# Patient Record
Sex: MALE | Race: WHITE | URBAN - METROPOLITAN AREA
[De-identification: names, ages, dates, MRNs, and addresses within clinical notes are randomized per-mention and may not be internally consistent; named-entity substitution may affect disease eponyms.]

---

## 2018-07-30 ENCOUNTER — OFFICE VISIT (OUTPATIENT)
Dept: URBAN - METROPOLITAN AREA CLINIC 193 | Facility: CLINIC | Age: 78
End: 2018-07-30
Payer: COMMERCIAL

## 2018-07-30 DIAGNOSIS — H18.51 ENDOTHELIAL CORNEAL DYSTROPHY: Primary | ICD-10-CM

## 2018-07-30 DIAGNOSIS — H52.223 REGULAR ASTIGMATISM, BILATERAL: ICD-10-CM

## 2018-07-30 PROCEDURE — 92014 COMPRE OPH EXAM EST PT 1/>: CPT | Performed by: OPTOMETRIST

## 2018-07-30 ASSESSMENT — VISUAL ACUITY
OS: 20/30
OD: 20/40+

## 2018-07-30 ASSESSMENT — INTRAOCULAR PRESSURE
OS: 20
OD: 19

## 2018-07-30 NOTE — IMPRESSION/PLAN
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 Fuch's OU. s/p DSAEK OU. Stable vision OU. Plan: Discussed condition. Call if worsens. Use ATs PRN.

## 2018-07-30 NOTE — IMPRESSION/PLAN
Impression: Regular astigmatism, bilateral: H52.223. Plan: A glasses prescription has been discussed and generated. Patient to call with any concerns.

## 2020-06-26 ENCOUNTER — OFFICE VISIT (OUTPATIENT)
Dept: URBAN - METROPOLITAN AREA CLINIC 71 | Facility: CLINIC | Age: 80
End: 2020-06-26
Payer: MEDICARE

## 2020-06-26 DIAGNOSIS — Z96.1 PRESENCE OF INTRAOCULAR LENS: ICD-10-CM

## 2020-06-26 DIAGNOSIS — H43.811 VITREOUS DEGENERATION, RIGHT EYE: Primary | ICD-10-CM

## 2020-06-26 PROCEDURE — 92014 COMPRE OPH EXAM EST PT 1/>: CPT | Performed by: OPTOMETRIST

## 2020-06-26 ASSESSMENT — INTRAOCULAR PRESSURE
OS: 21
OD: 19

## 2020-06-26 NOTE — IMPRESSION/PLAN
Impression: Vitreous degeneration, right eye: H43.811. Plan: Posterior vitreous detachment accounts for the patient's complaints. There is no evidence of associated retinal pathology. All signs and risks of retinal detachment and tears were discussed. Patient instructed to call the office immediately if any symptoms noted.

## 2021-06-29 ENCOUNTER — OFFICE VISIT (OUTPATIENT)
Dept: URBAN - METROPOLITAN AREA CLINIC 71 | Facility: CLINIC | Age: 81
End: 2021-06-29
Payer: MEDICARE

## 2021-06-29 DIAGNOSIS — H18.513 ENDOTHELIAL CORNEAL DYSTROPHY, BILATERAL: Primary | ICD-10-CM

## 2021-06-29 PROCEDURE — 92014 COMPRE OPH EXAM EST PT 1/>: CPT | Performed by: OPHTHALMOLOGY

## 2021-06-29 ASSESSMENT — INTRAOCULAR PRESSURE
OD: 21
OS: 23

## 2021-06-29 NOTE — IMPRESSION/PLAN
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 S/P DSAEK OU. Plan: Discussed. No additional treatment necessary. Will continue observation.  Contact office with any changes in vision

## 2022-08-12 ENCOUNTER — OFFICE VISIT (OUTPATIENT)
Dept: URBAN - METROPOLITAN AREA CLINIC 71 | Facility: CLINIC | Age: 82
End: 2022-08-12
Payer: MEDICARE

## 2022-08-12 DIAGNOSIS — H43.811 VITREOUS DEGENERATION, RIGHT EYE: ICD-10-CM

## 2022-08-12 DIAGNOSIS — H18.513 ENDOTHELIAL CORNEAL DYSTROPHY, BILATERAL: Primary | ICD-10-CM

## 2022-08-12 DIAGNOSIS — Z96.1 PRESENCE OF INTRAOCULAR LENS: ICD-10-CM

## 2022-08-12 PROCEDURE — 92014 COMPRE OPH EXAM EST PT 1/>: CPT | Performed by: OPTOMETRIST

## 2022-08-12 PROCEDURE — 92134 CPTRZ OPH DX IMG PST SGM RTA: CPT | Performed by: OPTOMETRIST

## 2022-08-12 ASSESSMENT — INTRAOCULAR PRESSURE
OD: 17
OS: 19

## 2022-08-12 NOTE — IMPRESSION/PLAN
Impression: Vitreous degeneration, right eye: H43.811. PVD stable OD. OCT mac 08/12/22: WNL OU. Plan: OCT mac ordered and performed today.  Continue to monitor with yearly DE.

## 2022-08-12 NOTE — IMPRESSION/PLAN
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 S/P DSAEK OU. Plan: Discussed. No additional treatment advised at this time. Will continue to observe. Contact office with any changes in vision.

## 2023-08-17 ENCOUNTER — OFFICE VISIT (OUTPATIENT)
Dept: URBAN - METROPOLITAN AREA CLINIC 71 | Facility: CLINIC | Age: 83
End: 2023-08-17
Payer: MEDICARE

## 2023-08-17 DIAGNOSIS — H18.513 ENDOTHELIAL CORNEAL DYSTROPHY, BILATERAL: ICD-10-CM

## 2023-08-17 DIAGNOSIS — Z96.1 PRESENCE OF INTRAOCULAR LENS: ICD-10-CM

## 2023-08-17 DIAGNOSIS — H02.9 UNSPECIFIED DISORDER OF EYELID: ICD-10-CM

## 2023-08-17 DIAGNOSIS — H43.811 VITREOUS DEGENERATION, RIGHT EYE: Primary | ICD-10-CM

## 2023-08-17 DIAGNOSIS — H40.053 OCULAR HYPERTENSION, BILATERAL: ICD-10-CM

## 2023-08-17 PROCEDURE — 92014 COMPRE OPH EXAM EST PT 1/>: CPT | Performed by: OPTOMETRIST

## 2023-08-17 ASSESSMENT — INTRAOCULAR PRESSURE
OS: 26
OD: 20

## 2023-10-06 ENCOUNTER — OFFICE VISIT (OUTPATIENT)
Dept: URBAN - METROPOLITAN AREA CLINIC 71 | Facility: CLINIC | Age: 83
End: 2023-10-06
Payer: MEDICARE

## 2023-10-06 DIAGNOSIS — H40.053 OCULAR HYPERTENSION, BILATERAL: Primary | ICD-10-CM

## 2023-10-06 DIAGNOSIS — Z96.1 PRESENCE OF INTRAOCULAR LENS: ICD-10-CM

## 2023-10-06 DIAGNOSIS — H18.513 ENDOTHELIAL CORNEAL DYSTROPHY, BILATERAL: ICD-10-CM

## 2023-10-06 PROCEDURE — 99213 OFFICE O/P EST LOW 20 MIN: CPT | Performed by: OPTOMETRIST

## 2023-10-06 PROCEDURE — 92133 CPTRZD OPH DX IMG PST SGM ON: CPT | Performed by: OPTOMETRIST

## 2023-10-06 PROCEDURE — 92083 EXTENDED VISUAL FIELD XM: CPT | Performed by: OPTOMETRIST

## 2023-10-06 PROCEDURE — 76514 ECHO EXAM OF EYE THICKNESS: CPT | Performed by: OPTOMETRIST

## 2023-10-06 RX ORDER — LATANOPROST 50 UG/ML
0.005 % SOLUTION OPHTHALMIC
Qty: 2.5 | Refills: 1 | Status: ACTIVE
Start: 2023-10-06

## 2023-10-06 ASSESSMENT — INTRAOCULAR PRESSURE
OS: 31
OS: 23
OD: 25
OD: 23

## 2023-11-03 ENCOUNTER — OFFICE VISIT (OUTPATIENT)
Dept: URBAN - METROPOLITAN AREA CLINIC 71 | Facility: CLINIC | Age: 83
End: 2023-11-03
Payer: MEDICARE

## 2023-11-03 DIAGNOSIS — H18.513 ENDOTHELIAL CORNEAL DYSTROPHY, BILATERAL: ICD-10-CM

## 2023-11-03 DIAGNOSIS — H40.053 OCULAR HYPERTENSION, BILATERAL: Primary | ICD-10-CM

## 2023-11-03 DIAGNOSIS — Z96.1 PRESENCE OF INTRAOCULAR LENS: ICD-10-CM

## 2023-11-03 PROCEDURE — 99213 OFFICE O/P EST LOW 20 MIN: CPT | Performed by: OPTOMETRIST

## 2023-11-03 RX ORDER — LATANOPROST 50 UG/ML
0.005 % SOLUTION OPHTHALMIC
Qty: 7.5 | Refills: 1 | Status: ACTIVE
Start: 2023-11-03

## 2023-11-03 ASSESSMENT — INTRAOCULAR PRESSURE
OD: 18
OS: 17

## 2024-03-06 ENCOUNTER — OFFICE VISIT (OUTPATIENT)
Dept: URBAN - METROPOLITAN AREA CLINIC 71 | Facility: CLINIC | Age: 84
End: 2024-03-06
Payer: MEDICARE

## 2024-03-06 DIAGNOSIS — H18.513 ENDOTHELIAL CORNEAL DYSTROPHY, BILATERAL: ICD-10-CM

## 2024-03-06 DIAGNOSIS — Z96.1 PRESENCE OF INTRAOCULAR LENS: ICD-10-CM

## 2024-03-06 DIAGNOSIS — H40.053 OCULAR HYPERTENSION, BILATERAL: Primary | ICD-10-CM

## 2024-03-06 PROCEDURE — 99213 OFFICE O/P EST LOW 20 MIN: CPT | Performed by: OPTOMETRIST

## 2024-03-06 ASSESSMENT — INTRAOCULAR PRESSURE
OS: 17
OD: 17

## 2024-07-03 ENCOUNTER — OFFICE VISIT (OUTPATIENT)
Dept: URBAN - METROPOLITAN AREA CLINIC 72 | Facility: CLINIC | Age: 84
End: 2024-07-03
Payer: MEDICARE

## 2024-07-03 DIAGNOSIS — H17.89 OTHER CORNEAL SCARS AND OPACITIES: ICD-10-CM

## 2024-07-03 DIAGNOSIS — H52.4 PRESBYOPIA: ICD-10-CM

## 2024-07-03 DIAGNOSIS — Z96.1 PRESENCE OF INTRAOCULAR LENS: ICD-10-CM

## 2024-07-03 DIAGNOSIS — H40.053 OCULAR HYPERTENSION, BILATERAL: Primary | ICD-10-CM

## 2024-07-03 PROCEDURE — 99212 OFFICE O/P EST SF 10 MIN: CPT

## 2024-07-03 RX ORDER — LATANOPROST 50 UG/ML
0.005 % SOLUTION OPHTHALMIC
Qty: 7.5 | Refills: 3 | Status: ACTIVE
Start: 2024-07-03

## 2024-07-03 ASSESSMENT — INTRAOCULAR PRESSURE
OS: 21
OD: 17

## 2024-07-03 ASSESSMENT — VISUAL ACUITY
OD: 20/20
OS: 20/20

## 2025-01-08 ENCOUNTER — OFFICE VISIT (OUTPATIENT)
Dept: URBAN - METROPOLITAN AREA CLINIC 72 | Facility: CLINIC | Age: 85
End: 2025-01-08
Payer: MEDICARE

## 2025-01-08 DIAGNOSIS — H40.053 OCULAR HYPERTENSION, BILATERAL: Primary | ICD-10-CM

## 2025-01-08 PROCEDURE — 92133 CPTRZD OPH DX IMG PST SGM ON: CPT

## 2025-01-08 PROCEDURE — 99213 OFFICE O/P EST LOW 20 MIN: CPT

## 2025-01-08 ASSESSMENT — INTRAOCULAR PRESSURE
OS: 17
OD: 15

## 2025-07-23 ENCOUNTER — OFFICE VISIT (OUTPATIENT)
Dept: URBAN - METROPOLITAN AREA CLINIC 72 | Facility: CLINIC | Age: 85
End: 2025-07-23
Payer: MEDICARE

## 2025-07-23 DIAGNOSIS — Z96.1 PRESENCE OF INTRAOCULAR LENS: ICD-10-CM

## 2025-07-23 DIAGNOSIS — H40.053 OCULAR HYPERTENSION, BILATERAL: Primary | ICD-10-CM

## 2025-07-23 PROCEDURE — 99213 OFFICE O/P EST LOW 20 MIN: CPT

## 2025-07-23 ASSESSMENT — INTRAOCULAR PRESSURE
OS: 18
OD: 17